# Patient Record
Sex: MALE | Race: WHITE | NOT HISPANIC OR LATINO | Employment: FULL TIME | URBAN - METROPOLITAN AREA
[De-identification: names, ages, dates, MRNs, and addresses within clinical notes are randomized per-mention and may not be internally consistent; named-entity substitution may affect disease eponyms.]

---

## 2018-08-02 ENCOUNTER — HOSPITAL ENCOUNTER (EMERGENCY)
Facility: HOSPITAL | Age: 23
Discharge: HOME | End: 2018-08-02
Attending: FAMILY MEDICINE

## 2018-08-02 VITALS
SYSTOLIC BLOOD PRESSURE: 131 MMHG | OXYGEN SATURATION: 99 % | WEIGHT: 135 LBS | HEIGHT: 71 IN | DIASTOLIC BLOOD PRESSURE: 98 MMHG | HEART RATE: 55 BPM | TEMPERATURE: 98 F | BODY MASS INDEX: 18.9 KG/M2 | RESPIRATION RATE: 16 BRPM

## 2018-08-02 DIAGNOSIS — M79.641 RIGHT HAND PAIN: ICD-10-CM

## 2018-08-02 DIAGNOSIS — R59.0 LYMPHADENOPATHY, INGUINAL: Primary | ICD-10-CM

## 2018-08-02 PROCEDURE — 99283 EMERGENCY DEPT VISIT LOW MDM: CPT | Mod: 25

## 2018-08-02 PROCEDURE — 73130 X-RAY EXAM OF HAND: CPT | Mod: TC,FY,RT

## 2018-08-02 PROCEDURE — 73130 X-RAY EXAM OF HAND: CPT | Mod: 26,RT,, | Performed by: RADIOLOGY

## 2018-08-02 RX ORDER — AMOXICILLIN AND CLAVULANATE POTASSIUM 875; 125 MG/1; MG/1
1 TABLET, FILM COATED ORAL 2 TIMES DAILY
Qty: 14 TABLET | Refills: 0 | Status: SHIPPED | OUTPATIENT
Start: 2018-08-02 | End: 2018-08-09

## 2018-08-02 RX ORDER — ONDANSETRON 4 MG/1
8 TABLET, FILM COATED ORAL 2 TIMES DAILY
COMMUNITY
End: 2018-12-03 | Stop reason: ALTCHOICE

## 2018-08-02 NOTE — ED NOTES
Patient is also complaining of right hand pain after punching a couch 2 weeks ago. No swelling or deformity noted

## 2018-08-02 NOTE — ED PROVIDER NOTES
"Encounter Date: 8/2/2018       History     Chief Complaint   Patient presents with    Groin Pain     Patient reports groin pain and "lumps" for the past 4 months. Pain and swelling worse the past 2 days.  No fever    Patient also complaints of right hand pain after punching door 2 weeks ago.          Review of patient's allergies indicates:   Allergen Reactions    Bactrim [sulfamethoxazole-trimethoprim]      Past Medical History:   Diagnosis Date    ADHD      Past Surgical History:   Procedure Laterality Date    MOUTH SURGERY       No family history on file.  Social History   Substance Use Topics    Smoking status: Former Smoker     Types: Cigarettes    Smokeless tobacco: Current User     Types: Snuff    Alcohol use Yes      Comment: Occasional     Review of Systems   Constitutional: Negative for fever.   HENT: Negative for sore throat.    Respiratory: Negative for shortness of breath.    Cardiovascular: Negative for chest pain.   Gastrointestinal: Negative for abdominal pain and nausea.   Genitourinary: Negative for discharge, dysuria, flank pain, penile pain and testicular pain.   Musculoskeletal: Positive for arthralgias. Negative for back pain.   Skin: Negative for rash.   Neurological: Negative for weakness.   Hematological: Positive for adenopathy. Does not bruise/bleed easily.   All other systems reviewed and are negative.      Physical Exam     Initial Vitals [08/02/18 1138]   BP Pulse Resp Temp SpO2   (!) 131/98 (!) 55 16 98.3 °F (36.8 °C) 99 %      MAP       --         Physical Exam    Nursing note and vitals reviewed.  Constitutional: He appears well-developed and well-nourished.   HENT:   Head: Normocephalic.   Eyes: Pupils are equal, round, and reactive to light.   Neck: Normal range of motion.   Cardiovascular: Normal rate and regular rhythm.   Pulmonary/Chest: Breath sounds normal.   Lymphadenopathy: Inguinal adenopathy noted on the right and left side.   Neurological: He is alert and oriented " to person, place, and time.   Skin: Skin is warm and dry.   Psychiatric: He has a normal mood and affect. His behavior is normal. Judgment and thought content normal.         ED Course   Procedures  Labs Reviewed - No data to display       Imaging Results          X-Ray Hand 3 View Right (In process)                  Medical Decision Making:   Initial Assessment:   Swollen lymph nodes  Right hand pain  ED Management:  XR hand negative acute fracture    Discussed physical exam findings with patient  No acute emergent medication condition identified at this time to warrant further testing/diagnostics.  Plan to discharge patient home with instructions to follow-up with PCP in 2-5 days or return to ED if symptoms worsen.  Patient verbalized agreement to discharge treatment plan.                        Clinical Impression:   The primary encounter diagnosis was Lymphadenopathy, inguinal. A diagnosis of Right hand pain was also pertinent to this visit.                             Nat Godwin NP  08/02/18 1232       Nat Godwin NP  08/02/18 5562

## 2018-12-03 ENCOUNTER — HOSPITAL ENCOUNTER (EMERGENCY)
Facility: HOSPITAL | Age: 23
Discharge: HOME | End: 2018-12-03
Attending: EMERGENCY MEDICINE

## 2018-12-03 VITALS
TEMPERATURE: 99 F | DIASTOLIC BLOOD PRESSURE: 72 MMHG | SYSTOLIC BLOOD PRESSURE: 143 MMHG | RESPIRATION RATE: 18 BRPM | BODY MASS INDEX: 18.9 KG/M2 | HEART RATE: 80 BPM | OXYGEN SATURATION: 98 % | WEIGHT: 135 LBS | HEIGHT: 71 IN

## 2018-12-03 DIAGNOSIS — F43.20 ADJUSTMENT DISORDER, UNSPECIFIED TYPE: Primary | ICD-10-CM

## 2018-12-03 PROCEDURE — 99283 EMERGENCY DEPT VISIT LOW MDM: CPT
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
## 2018-12-04 NOTE — ED NOTES
Bed: Exam 09  Expected date: 12/3/18  Expected time: 7:48 PM  Means of arrival: Ambulance Service  Comments:  Suicidal Ideation

## 2022-03-18 NOTE — ED NOTES
----- Message from Tomasa Beyer RN sent at 3/17/2022  4:26 PM EDT -----  Please schedule [facility] ASAP . Thank you!     Physician at bedside.

## 2022-03-20 ENCOUNTER — HOSPITAL ENCOUNTER (EMERGENCY)
Facility: HOSPITAL | Age: 27
Discharge: HOME | End: 2022-03-20
Attending: EMERGENCY MEDICINE

## 2022-03-20 VITALS
RESPIRATION RATE: 18 BRPM | HEIGHT: 71 IN | HEART RATE: 60 BPM | WEIGHT: 140 LBS | OXYGEN SATURATION: 98 % | DIASTOLIC BLOOD PRESSURE: 87 MMHG | BODY MASS INDEX: 19.6 KG/M2 | TEMPERATURE: 98 F | SYSTOLIC BLOOD PRESSURE: 122 MMHG

## 2022-03-20 DIAGNOSIS — R06.02 SOB (SHORTNESS OF BREATH): Primary | ICD-10-CM

## 2022-03-20 DIAGNOSIS — R07.9 CHEST PAIN, UNSPECIFIED: ICD-10-CM

## 2022-03-20 PROCEDURE — 93005 ELECTROCARDIOGRAM TRACING: CPT

## 2022-03-20 PROCEDURE — 93010 EKG 12-LEAD: ICD-10-PCS | Mod: ,,, | Performed by: INTERNAL MEDICINE

## 2022-03-20 PROCEDURE — 96372 THER/PROPH/DIAG INJ SC/IM: CPT | Performed by: EMERGENCY MEDICINE

## 2022-03-20 PROCEDURE — 63600175 PHARM REV CODE 636 W HCPCS: Performed by: EMERGENCY MEDICINE

## 2022-03-20 PROCEDURE — 99284 EMERGENCY DEPT VISIT MOD MDM: CPT | Mod: 25

## 2022-03-20 PROCEDURE — 71046 X-RAY EXAM CHEST 2 VIEWS: CPT | Mod: 26,,, | Performed by: RADIOLOGY

## 2022-03-20 PROCEDURE — 71046 XR CHEST PA AND LATERAL: ICD-10-PCS | Mod: 26,,, | Performed by: RADIOLOGY

## 2022-03-20 PROCEDURE — 93010 ELECTROCARDIOGRAM REPORT: CPT | Mod: ,,, | Performed by: INTERNAL MEDICINE

## 2022-03-20 PROCEDURE — 25000003 PHARM REV CODE 250: Performed by: EMERGENCY MEDICINE

## 2022-03-20 PROCEDURE — 71046 X-RAY EXAM CHEST 2 VIEWS: CPT | Mod: TC,FY

## 2022-03-20 RX ORDER — KETOROLAC TROMETHAMINE 30 MG/ML
30 INJECTION, SOLUTION INTRAMUSCULAR; INTRAVENOUS
Status: COMPLETED | OUTPATIENT
Start: 2022-03-20 | End: 2022-03-20

## 2022-03-20 RX ORDER — MAG HYDROX/ALUMINUM HYD/SIMETH 200-200-20
30 SUSPENSION, ORAL (FINAL DOSE FORM) ORAL ONCE
Status: COMPLETED | OUTPATIENT
Start: 2022-03-20 | End: 2022-03-20

## 2022-03-20 RX ORDER — LIDOCAINE HYDROCHLORIDE 20 MG/ML
10 SOLUTION OROPHARYNGEAL ONCE
Status: COMPLETED | OUTPATIENT
Start: 2022-03-20 | End: 2022-03-20

## 2022-03-20 RX ADMIN — KETOROLAC TROMETHAMINE 30 MG: 30 INJECTION, SOLUTION INTRAMUSCULAR; INTRAVENOUS at 02:03

## 2022-03-20 RX ADMIN — LIDOCAINE HYDROCHLORIDE 10 ML: 20 SOLUTION ORAL; TOPICAL at 01:03

## 2022-03-20 RX ADMIN — ALUMINUM HYDROXIDE, MAGNESIUM HYDROXIDE, AND SIMETHICONE 30 ML: 200; 200; 20 SUSPENSION ORAL at 01:03

## 2022-03-20 NOTE — ED PROVIDER NOTES
CHIEF COMPLAINT    Chief Complaint   Patient presents with    Shortness of Breath     Patient states that for the past 3 days he is having episodes of shortness of breath and feeling like there is a knot in the center of his chest.  Patient states it seems to get worse after smoking marijuana.         KAREN Mckeon is a 27 y.o. male who presents complaining of shortness of breath and chest pain in the center of his chest.  Feels more like a sharp, stabbing pain.  He says that when he smokes marijuana makes it worse.  He does have some anxiety as well.  Denies any recent fevers, chills, cough, abdominal pain, nausea, vomiting, diarrhea or dysuria.  Denies any other medical problems.  He says he drinks alcohol but denies any other illicit drug use besides marijuana.. The severity of the symptoms is moderate.    CURRENT MEDICATIONS    Prior to Admission medications    Not on File       ALLERGIES    Review of patient's allergies indicates:   Allergen Reactions    Bactrim [sulfamethoxazole-trimethoprim]        PAST MEDICAL HISTORY  Past Medical History:   Diagnosis Date    ADHD        SURGICAL HISTORY    Past Surgical History:   Procedure Laterality Date    MOUTH SURGERY         SOCIAL HISTORY    Social History     Socioeconomic History    Marital status: Single   Tobacco Use    Smoking status: Former Smoker     Types: Cigarettes    Smokeless tobacco: Current User     Types: Chew   Substance and Sexual Activity    Alcohol use: Yes     Comment: Occasional    Drug use: Yes     Types: Marijuana    Sexual activity: Yes     Birth control/protection: None       FAMILY HISTORY    History reviewed. No pertinent family history.    REVIEW OF SYSTEMS    Constitutional:  No reported fever, chills, weight loss or weakness.   Eyes:  No reported photophobia or discharge. No visual changes  HENT:  No reported sore throat or ear pain.   Respiratory:  No reported cough  Cardiovascular:  No reported palpitations or  "swelling.   GI:  No reported nausea, vomiting, or diarrhea.   Musculoskeletal:  No reported back pain.   Skin:  No reported rash.   Neurologic:  No reported headache, focal weakness or sensory changes.   Endocrine:  No reported polyuria or polydypsia.   Lymphatic:  No reported swollen glands.   Psychiatric:  No reported depression, suicidal ideation or homicidal ideation.   All Systems otherwise negative except as noted in the Review of Systems and History of Present Illness.    PHYSICAL EXAM    VITAL SIGNS: /87   Pulse 60   Temp 98 °F (36.7 °C) (Oral)   Resp 18   Ht 5' 11" (1.803 m)   Wt 63.5 kg (140 lb)   SpO2 98%   BMI 19.53 kg/m²    Constitutional:  Well developed, Well nourished who appears stated age, No acute distress, Non-toxic appearance.   Respiratory: Breath sounds clear to auscultation bilaterally, No respiratory distress, No wheezing, No chest tenderness.   Cardiovascular:  Normal heart rate, Normal rhythm, No murmurs, No rubs, No gallops.   GI:  Bowel sounds normal, Soft, No tenderness, No rebound or guarding, No masses or hepatosplenomegaly, No pulsatile masses.   Musculoskeletal:  Intact distal pulses, No edema, No cyanosis, No clubbing. Good range of motion in all major joints. No major deformities noted. No tenderness to palpation.  Integument:  Warm, Dry, No erythema, No rash.   Psychiatric:  Affect normal, Judgment normal, Mood normal.     LABS  Pertinent labs reviewed. (See chart for details)   Labs Reviewed - No data to display    EKG    EKG Interpretation     Interpreted by me     Rhythm: Normal Sinus  Rate: 67  Axis: Normal  Ectopy:  Sinus arrhythmia  Conduction: Normal  ST Segments: No Acute Change  T Waves: No Acute Change  Q Waves: None     Clinical Impression: No acute ischemic changes    RADIOLOGY    Imaging Results          X-Ray Chest PA And Lateral (Final result)  Result time 03/20/22 13:56:26    Final result by Bob Sykes Jr., MD (03/20/22 13:56:26)              "    Impression:      No acute abnormality.      Electronically signed by: Bob Sykes MD  Date:    03/20/2022  Time:    13:56             Narrative:    EXAMINATION:  XR CHEST PA AND LATERAL    CLINICAL HISTORY:  Chest pain, unspecified    TECHNIQUE:  PA and lateral views of the chest were performed.    COMPARISON:  None    FINDINGS:  The lungs are clear, with normal appearance of pulmonary vasculature and no pleural effusion or pneumothorax.    The cardiac silhouette is normal in size. The hilar and mediastinal contours are unremarkable.    Bones are intact.                              ED COURSE & MEDICAL DECISION MAKING    Medications   aluminum-magnesium hydroxide-simethicone 200-200-20 mg/5 mL suspension 30 mL (30 mLs Oral Given 3/20/22 1326)     And   LIDOcaine HCl 2% oral solution 10 mL (10 mLs Oral Given 3/20/22 1326)   ketorolac injection 30 mg (30 mg Intramuscular Given 3/20/22 1410)       The patient presents with the history as noted above. The differential diagnosis would include but is not limited to:  Costochondritis, muscle strain, ACS, PE     Patient presents with chest pain as above.  His EKG and chest x-ray are unremarkable for anything acute.  He was given GI cocktail without much improvement.  Also given Toradol which did seem to help some.  Symptoms do not appear cardiac in nature.  Seems to be worsening when smoking marijuana and does have a anxiety component as well.  Recommend stopping the marijuana.  ED return precautions given the patient.    FINAL IMPRESSION    1. SOB (shortness of breath)    2. Chest pain, unspecified          Terry Bustos    The patient was discharged to home with the following prescriptions:   There are no discharge medications for this patient.      I stressed the importance of close follow-up with:  [facility]  [address] [place] [id number] [phone]    As needed, If symptoms worsen    [facility]    In  1 week        I discussed the differential diagnosis, treatment plan, and strict return precautions for any worsening symptoms or new concerning symptoms, and they stated understanding.        **Parts of this note were created using [employer] Voice Recognition software program. While efforts were made to correct any mistakes made by this voice recognition software program, nonsensical phrases may remain in this note.       Terry Bustos, DO  03/20/22 0026

## 2023-03-02 ENCOUNTER — HOSPITAL ENCOUNTER (EMERGENCY)
Facility: HOSPITAL | Age: 28
Discharge: HOME | End: 2023-03-02

## 2023-03-02 VITALS
RESPIRATION RATE: 18 BRPM | SYSTOLIC BLOOD PRESSURE: 147 MMHG | WEIGHT: 150 LBS | DIASTOLIC BLOOD PRESSURE: 84 MMHG | TEMPERATURE: 98 F | HEART RATE: 88 BPM | BODY MASS INDEX: 21 KG/M2 | HEIGHT: 71 IN | OXYGEN SATURATION: 98 %

## 2023-03-02 DIAGNOSIS — S81.812A LACERATION OF LEFT LOWER EXTREMITY, INITIAL ENCOUNTER: Primary | ICD-10-CM

## 2023-03-02 PROCEDURE — 63600175 PHARM REV CODE 636 W HCPCS: Performed by: NURSE PRACTITIONER

## 2023-03-02 PROCEDURE — 90715 TDAP VACCINE 7 YRS/> IM: CPT | Performed by: NURSE PRACTITIONER

## 2023-03-02 PROCEDURE — 90471 IMMUNIZATION ADMIN: CPT | Performed by: NURSE PRACTITIONER

## 2023-03-02 PROCEDURE — 99284 EMERGENCY DEPT VISIT MOD MDM: CPT | Mod: 25

## 2023-03-02 PROCEDURE — 87389 HIV-1 AG W/HIV-1&-2 AB AG IA: CPT | Performed by: EMERGENCY MEDICINE

## 2023-03-02 PROCEDURE — 25000003 PHARM REV CODE 250: Performed by: NURSE PRACTITIONER

## 2023-03-02 PROCEDURE — 86803 HEPATITIS C AB TEST: CPT | Performed by: EMERGENCY MEDICINE

## 2023-03-02 PROCEDURE — 12001 RPR S/N/AX/GEN/TRNK 2.5CM/<: CPT

## 2023-03-02 RX ORDER — HYDROCODONE BITARTRATE AND ACETAMINOPHEN 7.5; 325 MG/1; MG/1
1 TABLET ORAL
Status: DISCONTINUED | OUTPATIENT
Start: 2023-03-02 | End: 2023-03-02 | Stop reason: HOSPADM

## 2023-03-02 RX ORDER — CLINDAMYCIN HYDROCHLORIDE 300 MG/1
300 CAPSULE ORAL EVERY 6 HOURS
Qty: 40 CAPSULE | Refills: 0 | Status: SHIPPED | OUTPATIENT
Start: 2023-03-02 | End: 2023-03-12

## 2023-03-02 RX ORDER — LIDOCAINE HYDROCHLORIDE 10 MG/ML
5 INJECTION, SOLUTION EPIDURAL; INFILTRATION; INTRACAUDAL; PERINEURAL
Status: COMPLETED | OUTPATIENT
Start: 2023-03-02 | End: 2023-03-02

## 2023-03-02 RX ORDER — DICLOFENAC SODIUM 75 MG/1
75 TABLET, DELAYED RELEASE ORAL 2 TIMES DAILY
Qty: 30 TABLET | Refills: 0 | Status: SHIPPED | OUTPATIENT
Start: 2023-03-02

## 2023-03-02 RX ORDER — CLINDAMYCIN HYDROCHLORIDE 150 MG/1
300 CAPSULE ORAL
Status: COMPLETED | OUTPATIENT
Start: 2023-03-02 | End: 2023-03-02

## 2023-03-02 RX ADMIN — TETANUS TOXOID, REDUCED DIPHTHERIA TOXOID AND ACELLULAR PERTUSSIS VACCINE, ADSORBED 0.5 ML: 5; 2.5; 8; 8; 2.5 SUSPENSION INTRAMUSCULAR at 07:03

## 2023-03-02 RX ADMIN — LIDOCAINE HYDROCHLORIDE 40 MG: 10 INJECTION, SOLUTION EPIDURAL; INFILTRATION; INTRACAUDAL; PERINEURAL at 07:03

## 2023-03-02 RX ADMIN — CLINDAMYCIN HYDROCHLORIDE 300 MG: 150 CAPSULE ORAL at 07:03

## 2023-03-03 LAB
HCV AB SERPL QL IA: NORMAL
HIV 1+2 AB+HIV1 P24 AG SERPL QL IA: NORMAL

## 2023-03-03 NOTE — ED NOTES
Pt reports cutting left ankle with metal wayne this am.Pt reports wrapping ankle and finished the day at work.

## 2023-03-03 NOTE — DISCHARGE INSTRUCTIONS
Rest, increase fluids, lots of water and liquids.  Keep area clean and dry.  The area already has mild redness.  Monitor for worsening symptoms.  Staple removal in 10-14 days

## 2024-08-16 ENCOUNTER — HOSPITAL ENCOUNTER (EMERGENCY)
Facility: HOSPITAL | Age: 29
Discharge: HOME | End: 2024-08-16
Attending: EMERGENCY MEDICINE

## 2024-08-16 VITALS
SYSTOLIC BLOOD PRESSURE: 123 MMHG | HEIGHT: 71 IN | OXYGEN SATURATION: 98 % | HEART RATE: 44 BPM | TEMPERATURE: 99 F | WEIGHT: 140 LBS | RESPIRATION RATE: 16 BRPM | BODY MASS INDEX: 19.6 KG/M2 | DIASTOLIC BLOOD PRESSURE: 75 MMHG

## 2024-08-16 DIAGNOSIS — R07.9 CHEST PAIN, UNSPECIFIED TYPE: ICD-10-CM

## 2024-08-16 DIAGNOSIS — R07.9 CHEST PAIN: ICD-10-CM

## 2024-08-16 DIAGNOSIS — I10 HYPERTENSION, UNSPECIFIED TYPE: Primary | ICD-10-CM

## 2024-08-16 DIAGNOSIS — R00.1 BRADYCARDIA: ICD-10-CM

## 2024-08-16 LAB
ALBUMIN SERPL BCP-MCNC: 4.3 G/DL (ref 3.5–5.2)
ALP SERPL-CCNC: 48 U/L (ref 55–135)
ALT SERPL W/O P-5'-P-CCNC: 18 U/L (ref 10–44)
ANION GAP SERPL CALC-SCNC: 11 MMOL/L (ref 8–16)
AST SERPL-CCNC: 17 U/L (ref 10–40)
BASOPHILS # BLD AUTO: 0.02 K/UL (ref 0–0.2)
BASOPHILS NFR BLD: 0.3 % (ref 0–1.9)
BILIRUB SERPL-MCNC: 0.8 MG/DL (ref 0.1–1)
BNP SERPL-MCNC: <10 PG/ML (ref 0–99)
BUN SERPL-MCNC: 10 MG/DL (ref 6–20)
CALCIUM SERPL-MCNC: 9.2 MG/DL (ref 8.7–10.5)
CHLORIDE SERPL-SCNC: 105 MMOL/L (ref 95–110)
CO2 SERPL-SCNC: 23 MMOL/L (ref 23–29)
CREAT SERPL-MCNC: 1 MG/DL (ref 0.5–1.4)
DIFFERENTIAL METHOD BLD: ABNORMAL
EOSINOPHIL # BLD AUTO: 0 K/UL (ref 0–0.5)
EOSINOPHIL NFR BLD: 0.4 % (ref 0–8)
ERYTHROCYTE [DISTWIDTH] IN BLOOD BY AUTOMATED COUNT: 11.9 % (ref 11.5–14.5)
EST. GFR  (NO RACE VARIABLE): >60 ML/MIN/1.73 M^2
GLUCOSE SERPL-MCNC: 121 MG/DL (ref 70–110)
HCT VFR BLD AUTO: 43.9 % (ref 40–54)
HCV AB SERPL QL IA: NORMAL
HGB BLD-MCNC: 15.3 G/DL (ref 14–18)
HIV 1+2 AB+HIV1 P24 AG SERPL QL IA: NORMAL
IMM GRANULOCYTES # BLD AUTO: 0.02 K/UL (ref 0–0.04)
IMM GRANULOCYTES NFR BLD AUTO: 0.3 % (ref 0–0.5)
LYMPHOCYTES # BLD AUTO: 1.3 K/UL (ref 1–4.8)
LYMPHOCYTES NFR BLD: 18.2 % (ref 18–48)
MCH RBC QN AUTO: 31.4 PG (ref 27–31)
MCHC RBC AUTO-ENTMCNC: 34.9 G/DL (ref 32–36)
MCV RBC AUTO: 90 FL (ref 82–98)
MONOCYTES # BLD AUTO: 0.6 K/UL (ref 0.3–1)
MONOCYTES NFR BLD: 7.8 % (ref 4–15)
NEUTROPHILS # BLD AUTO: 5.3 K/UL (ref 1.8–7.7)
NEUTROPHILS NFR BLD: 73 % (ref 38–73)
NRBC BLD-RTO: 0 /100 WBC
OHS QRS DURATION: 102 MS
OHS QTC CALCULATION: 408 MS
PLATELET # BLD AUTO: 202 K/UL (ref 150–450)
PMV BLD AUTO: 10.6 FL (ref 9.2–12.9)
POTASSIUM SERPL-SCNC: 3.7 MMOL/L (ref 3.5–5.1)
PROT SERPL-MCNC: 7.2 G/DL (ref 6–8.4)
RBC # BLD AUTO: 4.88 M/UL (ref 4.6–6.2)
SODIUM SERPL-SCNC: 139 MMOL/L (ref 136–145)
TROPONIN I SERPL DL<=0.01 NG/ML-MCNC: <0.006 NG/ML (ref 0–0.03)
WBC # BLD AUTO: 7.27 K/UL (ref 3.9–12.7)

## 2024-08-16 PROCEDURE — 85025 COMPLETE CBC W/AUTO DIFF WBC: CPT | Performed by: EMERGENCY MEDICINE

## 2024-08-16 PROCEDURE — 87389 HIV-1 AG W/HIV-1&-2 AB AG IA: CPT | Performed by: EMERGENCY MEDICINE

## 2024-08-16 PROCEDURE — 84484 ASSAY OF TROPONIN QUANT: CPT | Performed by: EMERGENCY MEDICINE

## 2024-08-16 PROCEDURE — 86803 HEPATITIS C AB TEST: CPT | Performed by: EMERGENCY MEDICINE

## 2024-08-16 PROCEDURE — 80053 COMPREHEN METABOLIC PANEL: CPT | Performed by: EMERGENCY MEDICINE

## 2024-08-16 PROCEDURE — 93005 ELECTROCARDIOGRAM TRACING: CPT

## 2024-08-16 PROCEDURE — 71045 X-RAY EXAM CHEST 1 VIEW: CPT | Mod: 26,,, | Performed by: RADIOLOGY

## 2024-08-16 PROCEDURE — 71045 X-RAY EXAM CHEST 1 VIEW: CPT | Mod: TC

## 2024-08-16 PROCEDURE — 83880 ASSAY OF NATRIURETIC PEPTIDE: CPT | Performed by: EMERGENCY MEDICINE

## 2024-08-16 PROCEDURE — 99285 EMERGENCY DEPT VISIT HI MDM: CPT | Mod: 25

## 2024-08-16 PROCEDURE — 93010 ELECTROCARDIOGRAM REPORT: CPT | Mod: ,,, | Performed by: INTERNAL MEDICINE

## 2024-08-16 NOTE — ED TRIAGE NOTES
Reports intermittent bilateral arm heaviness and throbbing for the past 2 weeks. Patient works in construction and feels dehydrated. Took Aleve with unclear results, as he went to sleep shortly after taking it. Active range of motion in both upper extremities is normal, neurovascular status is intact, and patient is in no acute distress.

## 2024-08-16 NOTE — ED PROVIDER NOTES
"Encounter Date: 8/16/2024       History     Chief Complaint   Patient presents with    Hypertension     Pt reports high blood pressure reading today. Denies hx of htn. Also c/o arm numbness/heaviness x a few days.      29-year-old male past history of marijuana alcohol use frequently presents for evaluation of vague chest discomfort, bilateral arm discomfort and achiness and feeling "tense in my forearms "for the last day.  States 2 days ago family member passed away.  Denies any known cardiac history.  Denies any recent fever, chills, cough, congestion, nausea, vomiting.  No reported falls or injuries.  No other exacerbating or relieving factors identified.      Review of patient's allergies indicates:   Allergen Reactions    Bactrim [sulfamethoxazole-trimethoprim]      Past Medical History:   Diagnosis Date    ADHD      Past Surgical History:   Procedure Laterality Date    MOUTH SURGERY       No family history on file.  Social History     Tobacco Use    Smoking status: Every Day     Types: Cigarettes, Vaping with nicotine    Smokeless tobacco: Current     Types: Chew   Substance Use Topics    Alcohol use: Yes     Comment: Occasional    Drug use: Yes     Types: Marijuana     Review of Systems   Constitutional:  Negative for fever.   HENT:  Negative for sore throat.    Respiratory:  Negative for shortness of breath.    Cardiovascular:  Positive for chest pain.   Gastrointestinal:  Negative for nausea.   Genitourinary:  Negative for dysuria.   Musculoskeletal:  Positive for myalgias. Negative for back pain.   Skin:  Negative for rash.   Neurological:  Negative for weakness.   Hematological:  Does not bruise/bleed easily.       Physical Exam     Initial Vitals [08/16/24 1113]   BP Pulse Resp Temp SpO2   (!) 173/90 63 15 98.7 °F (37.1 °C) 97 %      MAP       --         Physical Exam    Nursing note and vitals reviewed.  Constitutional: He appears well-developed and well-nourished.   HENT:   Head: Normocephalic and " atraumatic.   Eyes: EOM are normal. Pupils are equal, round, and reactive to light.   Neck: Neck supple.   Normal range of motion.  Cardiovascular:  Normal rate, regular rhythm and normal heart sounds.           No murmur heard.  Pulmonary/Chest: No respiratory distress. He has rales.   Abdominal: Abdomen is soft. Bowel sounds are normal.   Musculoskeletal:         General: Normal range of motion.      Cervical back: Normal range of motion and neck supple.     Neurological: He is alert and oriented to person, place, and time. He has normal strength. No cranial nerve deficit or sensory deficit.   Skin: Skin is warm and dry.   Psychiatric: He has a normal mood and affect.         ED Course   Procedures  Labs Reviewed   CBC W/ AUTO DIFFERENTIAL - Abnormal       Result Value    WBC 7.27      RBC 4.88      Hemoglobin 15.3      Hematocrit 43.9      MCV 90      MCH 31.4 (*)     MCHC 34.9      RDW 11.9      Platelets 202      MPV 10.6      Immature Granulocytes 0.3      Gran # (ANC) 5.3      Immature Grans (Abs) 0.02      Lymph # 1.3      Mono # 0.6      Eos # 0.0      Baso # 0.02      nRBC 0      Gran % 73.0      Lymph % 18.2      Mono % 7.8      Eosinophil % 0.4      Basophil % 0.3      Differential Method Automated      Narrative:     Release to patient->Immediate   COMPREHENSIVE METABOLIC PANEL - Abnormal    Sodium 139      Potassium 3.7      Chloride 105      CO2 23      Glucose 121 (*)     BUN 10      Creatinine 1.0      Calcium 9.2      Total Protein 7.2      Albumin 4.3      Total Bilirubin 0.8      Alkaline Phosphatase 48 (*)     AST 17      ALT 18      eGFR >60.0      Anion Gap 11      Narrative:     Release to patient->Immediate   HIV 1 / 2 ANTIBODY    HIV 1/2 Ag/Ab Non-reactive      Narrative:     Release to patient->Immediate   HEPATITIS C ANTIBODY    Hepatitis C Ab Non-reactive      Narrative:     Release to patient->Immediate   TROPONIN I    Troponin I <0.006      Narrative:     Release to patient->Immediate    B-TYPE NATRIURETIC PEPTIDE    BNP <10      Narrative:     Release to patient->Immediate     EKG Readings: (Independently Interpreted)   Eleven 50; sinus bradycardia 47 beats per minute normal axis, incomplete right bundle-branch block pattern otherwise normal intervals.     ECG Results              EKG 12-lead (Final result)        Collection Time Result Time QRS Duration OHS QTC Calculation    08/16/24 11:50:05 08/16/24 15:59:25 102 408                     Final result by Interface, Lab In [facility] (08/16/24 15:59:32)                   Narrative:    Test Reason : R07.9    Vent. Rate : 047 BPM     Atrial Rate : 047 BPM     P-R Int : 154 ms          QRS Dur : 102 ms      QT Int : 462 ms       P-R-T Axes : 059 075 068 degrees     QTc Int : 408 ms    Sinus bradycardia with sinus arrhythmia  Incomplete right bundle branch block  Borderline Abnormal ECG  When compared with ECG of 20-MAR-2022 13:09,  No significant change was found  Confirmed by Alfredito Fall MD ([id number]) on 8/16/2024 3:59:22 PM    Referred By: [email or web address]   SELF           Confirmed By:Alfredito Fall MD                                  Imaging Results              X-Ray Chest AP Portable (Final result)  Result time 08/16/24 11:51:41      Final result by Miguel A Landin MD (08/16/24 11:51:41)                   Impression:      1.  There is no acute cardiopulmonary abnormality.      Electronically signed by: Miguel A Landin MD  Date:    08/16/2024  Time:    11:51               Narrative:    EXAMINATION:  XR CHEST AP PORTABLE    CLINICAL HISTORY:  Chest Pain;    TECHNIQUE:  Portable AP view of the chest was obtained    COMPARISON:  Chest x-ray dated 03/20/2022    FINDINGS:  The patient is slightly tilted.  There is no acute abnormality or otherwise change in the appearance of the chest compared to the prior study.  The lungs are mildly hyperexpanded but clear.  This is unchanged.  There is no mass or infiltrate.  Heart size, mediastinal contour and pulmonary  vascularity are normal.  There is no pleural effusion.  There is no pneumothorax.  Osseous structures are normal.                                       Medications - No data to display  Medical Decision Making  Differential diagnosis; atypical chest pain, ACS, PE, dissection, GERD, reflux, chest wall pain, pneumonia, anxiety/panic attack    Patient very well-appearing and in no obvious distress.  Follow up labs, EKG, imaging and reassess    EKG did not reveal any obvious ischemia.  CBC, electrolytes, troponin and chest x-ray unremarkable.  Since several days of symptoms.  He was hypertensive when he initially arrived but it came down to the 130 systolic without any intervention.  He is a smoker.  No other risk factors.  Heart score is 1.  It has been ongoing for several days.  No indication for repeat troponin at this time.  History and exam is not consistent with ACS/PE/dissection or other life-threatening cause of his symptoms.  I counseled the patient to stop smoking, follow up as an outpatient for recheck of his blood pressure.  Of note the patient's heart rate does sit in the 40s pretty regularly but he is not symptomatic and has no hypotension with it.  We will refer outpatient Cardiology.  Follow up and return instructions given.    Amount and/or Complexity of Data Reviewed  Labs: ordered.  Radiology: ordered.      Additional MDM:   Heart Score:    History:          Slightly suspicious.  ECG:             Normal  Age:               Less than 45 years  Risk factors: 1-2 risk factors  Troponin:       Less than or equal to normal limit  Heart Score = 1                                       Clinical Impression:  Final diagnoses:  [R07.9] Chest pain  [I10] Hypertension, unspecified type (Primary)  [R07.9] Chest pain, unspecified type  [R00.1] Bradycardia          ED Disposition Condition    Discharge Stable          ED Prescriptions    None       Follow-up Information       Follow up With Specialties Details Why  Contact Info    Alfredito Fall MD Cardiology, General Surgery In 3 days for evaluation [address] [facility] [place] [address] [phone]      Ferny Rosenbaum MD Family Medicine Schedule an appointment as soon as possible for a visit in 1 week for evaluation [address] [facility] [address]  [phone]               Philip Nagy MD  08/17/24 1015

## 2024-08-29 ENCOUNTER — HOSPITAL ENCOUNTER (EMERGENCY)
Facility: HOSPITAL | Age: 29
Discharge: HOME | End: 2024-08-29
Attending: STUDENT IN AN ORGANIZED HEALTH CARE EDUCATION/TRAINING PROGRAM

## 2024-08-29 VITALS
HEIGHT: 71 IN | SYSTOLIC BLOOD PRESSURE: 168 MMHG | WEIGHT: 140 LBS | HEART RATE: 60 BPM | TEMPERATURE: 99 F | OXYGEN SATURATION: 99 % | BODY MASS INDEX: 19.6 KG/M2 | RESPIRATION RATE: 18 BRPM | DIASTOLIC BLOOD PRESSURE: 98 MMHG

## 2024-08-29 DIAGNOSIS — H10.9 CONJUNCTIVITIS OF RIGHT EYE, UNSPECIFIED CONJUNCTIVITIS TYPE: Primary | ICD-10-CM

## 2024-08-29 PROCEDURE — 90715 TDAP VACCINE 7 YRS/> IM: CPT | Performed by: NURSE PRACTITIONER

## 2024-08-29 PROCEDURE — 63600175 PHARM REV CODE 636 W HCPCS: Performed by: NURSE PRACTITIONER

## 2024-08-29 PROCEDURE — 25000003 PHARM REV CODE 250: Performed by: NURSE PRACTITIONER

## 2024-08-29 PROCEDURE — 99284 EMERGENCY DEPT VISIT MOD MDM: CPT | Mod: 25

## 2024-08-29 PROCEDURE — 90471 IMMUNIZATION ADMIN: CPT | Performed by: NURSE PRACTITIONER

## 2024-08-29 RX ORDER — HYDROCODONE BITARTRATE AND ACETAMINOPHEN 7.5; 325 MG/1; MG/1
1 TABLET ORAL EVERY 6 HOURS PRN
Qty: 12 TABLET | Refills: 0 | Status: SHIPPED | OUTPATIENT
Start: 2024-08-29

## 2024-08-29 RX ORDER — HYDROCODONE BITARTRATE AND ACETAMINOPHEN 7.5; 325 MG/1; MG/1
1 TABLET ORAL
Status: COMPLETED | OUTPATIENT
Start: 2024-08-29 | End: 2024-08-29

## 2024-08-29 RX ORDER — CIPROFLOXACIN HYDROCHLORIDE 3 MG/ML
SOLUTION/ DROPS OPHTHALMIC
Qty: 10 ML | Refills: 1 | Status: SHIPPED | OUTPATIENT
Start: 2024-08-29

## 2024-08-29 RX ORDER — TETRACAINE HYDROCHLORIDE 5 MG/ML
2 SOLUTION OPHTHALMIC
Status: COMPLETED | OUTPATIENT
Start: 2024-08-29 | End: 2024-08-29

## 2024-08-29 RX ORDER — DIPHENHYDRAMINE HCL 25 MG
50 CAPSULE ORAL
Status: COMPLETED | OUTPATIENT
Start: 2024-08-29 | End: 2024-08-29

## 2024-08-29 RX ADMIN — FLUORESCEIN SODIUM 1 EACH: 1 STRIP OPHTHALMIC at 06:08

## 2024-08-29 RX ADMIN — DIPHENHYDRAMINE HYDROCHLORIDE 50 MG: 25 CAPSULE ORAL at 07:08

## 2024-08-29 RX ADMIN — TETRACAINE HYDROCHLORIDE 2 DROP: 5 SOLUTION OPHTHALMIC at 06:08

## 2024-08-29 RX ADMIN — HYDROCODONE BITARTRATE AND ACETAMINOPHEN 1 TABLET: 7.5; 325 TABLET ORAL at 07:08

## 2024-08-29 RX ADMIN — TETANUS TOXOID, REDUCED DIPHTHERIA TOXOID AND ACELLULAR PERTUSSIS VACCINE, ADSORBED 0.5 ML: 5; 2.5; 8; 8; 2.5 SUSPENSION INTRAMUSCULAR at 07:08

## 2024-08-29 NOTE — ED TRIAGE NOTES
Pt c/o swelling/irritation to R eye. Pt reports he felt sensation of foreign object yesterday. States he wears protective eyewear and shield at work. This morning reports he woke up to eye being swollen.

## 2024-08-29 NOTE — DISCHARGE INSTRUCTIONS
Rest, increase fluids, lots of water and liquids. Cool compresses every 3-4 hours. Call your eye clinic tomorrow for recheck. No contact use for at least 4 weeks, and you should have recheck by eye clinic before resuming contact use. Return as needed

## 2024-08-30 NOTE — ED PROVIDER NOTES
Encounter Date: 8/29/2024       History     Chief Complaint   Patient presents with    Eye Pain     POV to ED with significant other.  Patient complains of foreign body sensation in the right eye, onset yesterday.  States it feels like there is something in his right eye.  He does wear contacts.  States he removed the contact yesterday, he tried to put it back in this morning, was unsuccessful.  He denies injury that he was aware of.  No fever or vomiting.  No other complaints. TD unknown    The history is provided by the patient.     Review of patient's allergies indicates:   Allergen Reactions    Bactrim [sulfamethoxazole-trimethoprim]      Past Medical History:   Diagnosis Date    ADHD      Past Surgical History:   Procedure Laterality Date    MOUTH SURGERY       No family history on file.  Social History     Tobacco Use    Smoking status: Every Day     Types: Cigarettes, Vaping with nicotine    Smokeless tobacco: Current     Types: Chew   Substance Use Topics    Alcohol use: Yes     Comment: Occasional    Drug use: Yes     Types: Marijuana     Review of Systems   Constitutional:  Negative for chills and fever.   Eyes:  Positive for photophobia, discharge and redness.   All other systems reviewed and are negative.      Physical Exam     Initial Vitals [08/29/24 1740]   BP Pulse Resp Temp SpO2   (!) 140/83 60 20 98.5 °F (36.9 °C) 99 %      MAP       --         Physical Exam    Nursing note and vitals reviewed.  Constitutional: He appears well-developed and well-nourished. No distress.   HENT:   Head: Normocephalic and atraumatic.   Mouth/Throat: Oropharynx is clear and moist.   Eyes: Pupils are equal, round, and reactive to light.   Fluorescein stain exam:  No uptake appreciated, no foreign body noted.  Conjunctival redness right, Mild puffiness noted to the right upper and lower lid.  Soft.  No cellulitis. Left eye normal, no pain   Neck:   Normal range of motion.  Cardiovascular:  Normal rate and regular rhythm.            Pulmonary/Chest: Breath sounds normal. No respiratory distress.   Abdominal: Abdomen is soft.   Musculoskeletal:         General: Normal range of motion.      Cervical back: Normal range of motion.     Neurological: He is alert and oriented to person, place, and time. He has normal strength. GCS score is 15. GCS eye subscore is 4. GCS verbal subscore is 5. GCS motor subscore is 6.   Skin: Skin is warm and dry. Capillary refill takes less than 2 seconds.   Psychiatric: He has a normal mood and affect. Thought content normal.         ED Course   Procedures  Labs Reviewed - No data to display       Imaging Results    None          Medications   fluorescein ophthalmic strip 1 each (1 each Right Eye Given by Provider 8/29/24 1830)   TETRAcaine HCl (PF) 0.5 % Drop 2 drop (2 drops Left Eye Given by Provider 8/29/24 1830)   Tdap (BOOSTRIX) vaccine injection 0.5 mL (0.5 mLs Intramuscular Given 8/29/24 1900)   HYDROcodone-acetaminophen 7.5-325 mg per tablet 1 tablet (1 tablet Oral Given 8/29/24 1904)   diphenhydrAMINE capsule 50 mg (50 mg Oral Given 8/29/24 1904)     Medical Decision Making  Presents for evaluation of right eye irritation, see HPI  Differentials include but not limited to conjunctivitis, corneal abrasion, foreign body, corneal ulcer  Discharged home, diagnosis conjunctivitis of right eye.  Do not appreciate a foreign body, or any corneal abrasion.  Patient will be prescribed ciprofloxacin to take as directed.  Pain control in the ED, prescriptions for home use.  Tetanus update.  Instructed to Rest, increase fluids, lots of water and liquids. Cool compresses every 3-4 hours. Call your eye clinic tomorrow for recheck. No contact use for at least 4 weeks, and you should have recheck by eye clinic before resuming contact use. Return as needed. He agrees with plan of care    Risk  OTC drugs.  Prescription drug management.                                      Clinical Impression:  Final  diagnoses:  [H10.9] Conjunctivitis of right eye, unspecified conjunctivitis type - CONTACT USE (Primary)          ED Disposition Condition    Discharge Stable          ED Prescriptions       Medication Sig Dispense Start Date End Date Auth. Provider    ciprofloxacin HCl (CILOXAN) 0.3 % ophthalmic solution 2 drops right eye every 2 hours while awake for 2 days, then every 4 hours for 5 days 10 mL 8/29/2024 -- Mary Harman NP    HYDROcodone-acetaminophen (NORCO) 7.5-325 mg per tablet Take 1 tablet by mouth every 6 (six) hours as needed for Pain. 12 tablet 8/29/2024 -- Mary Harman NP          Follow-up Information       Follow up With Specialties Details Why Contact Info    Your Eye clinic at [facility]  Call in 1 day               Mary Harman NP  08/29/24 1921